# Patient Record
Sex: MALE | Race: WHITE | URBAN - METROPOLITAN AREA
[De-identification: names, ages, dates, MRNs, and addresses within clinical notes are randomized per-mention and may not be internally consistent; named-entity substitution may affect disease eponyms.]

---

## 2018-07-12 ENCOUNTER — HOSPITAL ENCOUNTER (EMERGENCY)
Facility: CLINIC | Age: 28
Discharge: HOME | End: 2018-07-12
Attending: EMERGENCY MEDICINE | Admitting: EMERGENCY MEDICINE
Payer: OTHER MISCELLANEOUS

## 2018-07-12 ENCOUNTER — APPOINTMENT (OUTPATIENT)
Dept: GENERAL RADIOLOGY | Facility: CLINIC | Age: 28
End: 2018-07-12
Attending: EMERGENCY MEDICINE
Payer: OTHER MISCELLANEOUS

## 2018-07-12 VITALS
WEIGHT: 170 LBS | OXYGEN SATURATION: 97 % | TEMPERATURE: 98 F | SYSTOLIC BLOOD PRESSURE: 150 MMHG | RESPIRATION RATE: 20 BRPM | HEART RATE: 77 BPM | DIASTOLIC BLOOD PRESSURE: 89 MMHG
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
## 2018-07-12 DIAGNOSIS — M70.42 PREPATELLAR BURSITIS OF LEFT KNEE: ICD-10-CM

## 2018-07-12 DIAGNOSIS — M25.562 ACUTE PAIN OF LEFT KNEE: ICD-10-CM

## 2018-07-12 PROCEDURE — 99283 EMERGENCY DEPT VISIT LOW MDM: CPT

## 2018-07-12 PROCEDURE — 73562 X-RAY EXAM OF KNEE 3: CPT | Mod: LT

## 2018-07-12 ASSESSMENT — ENCOUNTER SYMPTOMS
NUMBNESS: 0
ARTHRALGIAS: 1
WEAKNESS: 0

## 2018-07-12 NOTE — LETTER
[facility] EMERGENCY DEPARTMENT  [address]  [facility] [address]  [phone]    Garry Perez  [address]  [place] [address]  [phone] (home)     : 1990      To Whom it may concern:    Garry Perez was seen in our Emergency Department today, 2018 for an injury that was reported to be work related.  He will not return to work today or tomorrow.  He can return afterwards and will be weight bearing as tolerated in the left leg for 1 week.    Sincerely,      Tennille David MD

## 2018-07-12 NOTE — ED AVS SNAPSHOT
[facility] Emergency Department    [address]    [facility] [address]    Phone:  [phone]    Fax:  [phone]                                       Garry Perez   MRN: [id number]    Department:  [facility] Emergency Department   Date of Visit:  7/12/2018           After Visit Summary Signature Page     I have received my discharge instructions, and my questions have been answered. I have discussed any challenges I see with this plan with the nurse or doctor.    ..........................................................................................................................................  Patient/Patient Representative Signature      ..........................................................................................................................................  Patient Representative Print Name and Relationship to Patient    ..................................................               ................................................  Date                                            Time    ..........................................................................................................................................  Reviewed by Signature/Title    ...................................................              ..............................................  Date                                                            Time

## 2018-07-12 NOTE — ED AVS SNAPSHOT
[facility] Emergency Department    [address] [place] [address]    Phone:  [phone]    Fax:  [phone]                                       Garry Perez   MRN: [id number]    Department:  [facility] Emergency Department   Date of Visit:  7/12/2018           Patient Information     Date Of Birth          1990        Your diagnoses for this visit were:     Acute pain of left knee     Prepatellar bursitis of left knee, traumatic     Work related injury        You were seen by Tennille David MD.      Follow-up Information     Follow up with In clinic In 1 week.    Why:  As needed        Discharge Instructions       Prescription strength dosing instructions:  - 600mg ibuprofen (Advil, Motrin) every 6 hours as needed for fever/pain/inflammation.  Naprosyn (Naproxen, Aleve) works similarly and can be used instead, if preferred.  - 650mg acetaminophen (tylenol) every 4 hours or 1000mg every 6 hours (maximum of 4000mg in 24 hours).  Acetaminophen is often in combination over the counter and prescription pain medications.  Be sure to include this in daily total.    These medications work differently and can be used in combination.        24 Hour Appointment Hotline       To make an appointment at any [facility], call [phone] ([phone]). If you don't have a family doctor or clinic, we will help you find one. [facility] are conveniently located to serve the needs of you and your family.             Review of your medicines      Notice     You have not been prescribed any medications.            Procedures and tests performed during your visit     XR Knee Left 3 Views      Orders Needing Specimen Collection     None      Pending Results     Date and Time Order Name Status Description    7/12/2018 1940 XR Knee Left 3 Views Preliminary             Pending Culture Results     No orders found from 7/10/2018 to 7/13/2018.            Pending  Results Instructions     If you had any lab results that were not finalized at the time of your Discharge, you can call the ED Lab Result RN at [phone]. You will be contacted by this team for any positive Lab results or changes in treatment. The nurses are available 7 days a week from 10A to 6:30P.  You can leave a message 24 hours per day and they will return your call.        Test Results From Your Hospital Stay        7/12/2018  9:05 PM      Narrative     LEFT KNEE THREE VIEWS  7/12/2018 8:04 PM    HISTORY: Fall. Pain with movement. Bruising. Evaluate for patellar  fracture.    COMPARISON: None.    FINDINGS: No fracture or osseous lesion is seen. The joint spaces are  well preserved. Soft tissue swelling is seen anterior to the patella.  No other soft tissue pathology is seen.        Impression     IMPRESSION: Soft tissue swelling anterior to the patella with no  fracture or other abnormality.                Clinical Quality Measure: Blood Pressure Screening     Your blood pressure was checked while you were in the emergency department today. The last reading we obtained was  BP: 145/88 . Please read the guidelines below about what these numbers mean and what you should do about them.  If your systolic blood pressure (the top number) is less than 120 and your diastolic blood pressure (the bottom number) is less than 80, then your blood pressure is normal. There is nothing more that you need to do about it.  If your systolic blood pressure (the top number) is 120-139 or your diastolic blood pressure (the bottom number) is 80-89, your blood pressure may be higher than it should be. You should have your blood pressure rechecked within a year by a primary care provider.  If your systolic blood pressure (the top number) is 140 or greater or your diastolic blood pressure (the bottom number) is 90 or greater, you may have high blood pressure. High blood pressure is treatable, but if left untreated over time it can  "put you at risk for heart attack, stroke, or kidney failure. You should have your blood pressure rechecked by a primary care provider within the next 4 weeks.  If your provider in the emergency department today gave you specific instructions to follow-up with your doctor or provider even sooner than that, you should follow that instruction and not wait for up to 4 weeks for your follow-up visit.        Thank you for choosing [facility]       Thank you for choosing [facility] for your care. Our goal is always to provide you with excellent care. Hearing back from our patients is one way we can continue to improve our services. Please take a few minutes to complete the written survey that you may receive in the mail after you visit with us. Thank you!        [employer] Information     [employer] lets you send messages to your doctor, view your test results, renew your prescriptions, schedule appointments and more. To sign up, go to [email or web address] . Click on \"Log in\" on the left side of the screen, which will take you to the Welcome page. Then click on \"Sign up Now\" on the right side of the page.     You will be asked to enter the access code listed below, as well as some personal information. Please follow the directions to create your username and password.     Your access code is: [email or web address]  Expires: 10/10/2018  9:10 PM     Your access code will  in 90 days. If you need help or a new code, please call your [facility] clinic or [phone].        Care EveryWhere ID     This is your Care EveryWhere ID. This could be used by other organizations to access your [facility] medical records  [id number]        Equal Access to Services     [facility] Hospital[facility]ANIK[facility] : Hadii sourav dickson hadgarcía Soroel, waaxda luqadaha, qaybta kaalmada dmitry lopez . So [facility] [phone].    ATENCIÓN: Si habla español, tiene a meraz disposición servicios gratuitos de asistencia lingüística. Llame al [phone].    We " comply with applicable federal civil rights laws and [place] laws. We do not discriminate on the basis of race, color, national origin, age, disability, sex, sexual orientation, or gender identity.            After Visit Summary       This is your record. Keep this with you and show to your community pharmacist(s) and doctor(s) at your next visit.

## 2018-07-13 NOTE — DISCHARGE INSTRUCTIONS
Prescription strength dosing instructions:  - 600mg ibuprofen (Advil, Motrin) every 6 hours as needed for fever/pain/inflammation.  Naprosyn (Naproxen, Aleve) works similarly and can be used instead, if preferred.  - 650mg acetaminophen (tylenol) every 4 hours or 1000mg every 6 hours (maximum of 4000mg in 24 hours).  Acetaminophen is often in combination over the counter and prescription pain medications.  Be sure to include this in daily total.    These medications work differently and can be used in combination.

## 2018-07-13 NOTE — ED PROVIDER NOTES
History     Chief Complaint:  Knee Pain    HPI   Garry Perez is a healthy, 27 year old male who presents to the ED for evaluation of knee pain. The patient reports that he was at work at [employer] just prior to arrival, when he slipped in the rain, hitting his left knee on a cinder block. Given his pain, he presented to the ED. He denies any numbness, weakness, or any other symptoms or concerns. He has not taken any medications for his pain.    Allergies:  NKDA    Medications:    The patient is currently on no regular medications.    Past Medical History:    Hearing Loss    Past Surgical History:    The patient does not have any pertinent past surgical history.    Family History:    No past pertinent family history.    Social History:  Patient works for [employer].    Review of Systems   Musculoskeletal: Positive for arthralgias.   Neurological: Negative for weakness and numbness.   All other systems reviewed and are negative.    Physical Exam     Patient Vitals for the past 24 hrs:   BP Temp Temp src Pulse Heart Rate Resp SpO2 Weight   07/12/18 1949 - - - - - - 97 % -   07/12/18 1948 145/88 - - - - - - -   07/12/18 1938 (!) 146/95 98  F (36.7  C) Oral 71 71 16 99 % 77.1 kg (170 lb)     Physical Exam  General: Alert and cooperative.   Resp:  Non-labored  CV:  No murmur  Skin:  Small abrasion over L patella  Neuro:  Speech is normal and fluent.   MSkel:  Moving all extremities.  Marked swelling and tenderness over L patella.  No tenderness proximal tibia or fibula.  Distal CMS intact.        Emergency Department Course   Imaging:  Radiographic findings were communicated with the patient who voiced understanding of the findings.  XR Knee 3 views, left:   Soft tissue swelling anterior to the patella with no fracture or other abnormality, as per radiology.     Emergency Department Course:  Nursing notes and vitals reviewed. (1952) I performed an exam of the patient as documented above.     The patient was  sent for a Knee XR while in the emergency department, findings above.     (210) I rechecked the patient and discussed the results of his workup thus far.     Findings and plan explained to the Patient. Patient discharged home with instructions regarding supportive care, medications, and reasons to return. The importance of close follow-up was reviewed.    I personally reviewed the laboratory results with the Patient and answered all related questions prior to discharge.     Impression & Plan      Medical Decision Making:  Garry Perez is a 27 year old male who presents to the ED for evaluation of traumatic left knee pain and a work related injury. Differential includes patellar fracture and traumatic prepatellar bursitis. Xrays were obtained and were normal. Treatment for traumatic bursitis involves compression, ice, NSAIDS, and elevation. He will be taking tomorrow off of work, and can return with weight bearing as tolerated over the next week.     Diagnosis:    ICD-10-CM   1. Acute pain of left knee M25.562   2. Prepatellar bursitis of left knee, traumatic M70.42   3. Work related injury Y99.0     Disposition:  discharged to home    Scribe Disclosure:  I, Graciela Swanson, am serving as a scribe on 7/12/2018 at 7:52 PM to personally document services performed by Tennille David, * based on my observations and the provider's statements to me.     Graciela Swanson  7/12/2018   [facility] EMERGENCY DEPARTMENT       Tennille David MD  07/12/18 [id number]